# Patient Record
Sex: FEMALE | Race: WHITE | NOT HISPANIC OR LATINO | Employment: FULL TIME | URBAN - METROPOLITAN AREA
[De-identification: names, ages, dates, MRNs, and addresses within clinical notes are randomized per-mention and may not be internally consistent; named-entity substitution may affect disease eponyms.]

---

## 2023-01-19 ENCOUNTER — HOSPITAL ENCOUNTER (OUTPATIENT)
Dept: RADIOLOGY | Facility: REHABILITATION | Age: 38
End: 2023-01-19
Attending: STUDENT IN AN ORGANIZED HEALTH CARE EDUCATION/TRAINING PROGRAM

## 2023-01-19 ENCOUNTER — HOSPITAL ENCOUNTER (OUTPATIENT)
Facility: REHABILITATION | Age: 38
End: 2023-01-19
Attending: STUDENT IN AN ORGANIZED HEALTH CARE EDUCATION/TRAINING PROGRAM | Admitting: STUDENT IN AN ORGANIZED HEALTH CARE EDUCATION/TRAINING PROGRAM
Payer: COMMERCIAL

## 2023-01-19 VITALS
DIASTOLIC BLOOD PRESSURE: 84 MMHG | HEART RATE: 91 BPM | BODY MASS INDEX: 27.29 KG/M2 | WEIGHT: 163.8 LBS | TEMPERATURE: 98.5 F | HEIGHT: 65 IN | SYSTOLIC BLOOD PRESSURE: 142 MMHG | RESPIRATION RATE: 16 BRPM | OXYGEN SATURATION: 98 %

## 2023-01-19 PROCEDURE — 62323 NJX INTERLAMINAR LMBR/SAC: CPT

## 2023-01-19 PROCEDURE — 700111 HCHG RX REV CODE 636 W/ 250 OVERRIDE (IP)

## 2023-01-19 PROCEDURE — 700117 HCHG RX CONTRAST REV CODE 255

## 2023-01-19 RX ORDER — DEXAMETHASONE SODIUM PHOSPHATE 10 MG/ML
INJECTION, SOLUTION INTRAMUSCULAR; INTRAVENOUS
Status: COMPLETED
Start: 2023-01-19 | End: 2023-01-19

## 2023-01-19 RX ORDER — HYDROCODONE BITARTRATE AND ACETAMINOPHEN 10; 325 MG/1; MG/1
1-2 TABLET ORAL EVERY 6 HOURS PRN
COMMUNITY

## 2023-01-19 RX ORDER — IBUPROFEN 800 MG/1
800 TABLET ORAL EVERY 8 HOURS PRN
COMMUNITY

## 2023-01-19 RX ORDER — ONDANSETRON 2 MG/ML
4 INJECTION INTRAMUSCULAR; INTRAVENOUS
Status: DISCONTINUED | OUTPATIENT
Start: 2023-01-19 | End: 2023-01-19 | Stop reason: HOSPADM

## 2023-01-19 RX ORDER — GABAPENTIN 300 MG/1
300 CAPSULE ORAL 3 TIMES DAILY
COMMUNITY

## 2023-01-19 RX ORDER — CYCLOBENZAPRINE HCL 10 MG
10 TABLET ORAL 3 TIMES DAILY PRN
COMMUNITY

## 2023-01-19 RX ADMIN — DEXAMETHASONE SODIUM PHOSPHATE 10 MG: 10 INJECTION INTRAMUSCULAR; INTRAVENOUS at 14:00

## 2023-01-19 RX ADMIN — IOHEXOL 10 ML: 240 INJECTION, SOLUTION INTRATHECAL; INTRAVASCULAR; INTRAVENOUS; ORAL at 14:00

## 2023-01-19 ASSESSMENT — PAIN DESCRIPTION - PAIN TYPE: TYPE: CHRONIC PAIN

## 2023-01-19 NOTE — H&P
Surgery Neurosurgery History & Physical Note    Date  1/19/2023    Primary Care Physician  No primary care provider on file.    CC  Pre-Op Diagnosis Codes:     * Intervertebral disc disorders with radiculopathy, lumbar region [M51.16]    HPI  This is a 37 y.o. female who presented with low back and leg pain    History reviewed. No pertinent past medical history.    History reviewed. No pertinent surgical history.    No current facility-administered medications for this encounter.       Social History     Socioeconomic History    Marital status: Single     Spouse name: Not on file    Number of children: Not on file    Years of education: Not on file    Highest education level: Not on file   Occupational History    Not on file   Tobacco Use    Smoking status: Not on file    Smokeless tobacco: Not on file   Substance and Sexual Activity    Alcohol use: Not on file    Drug use: Not on file    Sexual activity: Not on file   Other Topics Concern    Not on file   Social History Narrative    Not on file     Social Determinants of Health     Financial Resource Strain: Not on file   Food Insecurity: Not on file   Transportation Needs: Not on file   Physical Activity: Not on file   Stress: Not on file   Social Connections: Not on file   Intimate Partner Violence: Not on file   Housing Stability: Not on file       History reviewed. No pertinent family history.    Allergies  Patient has no allergy information on record.    Review of Systems  Negative    Physical Exam  Constitutional:       Appearance: She is normal weight.   HENT:      Head: Normocephalic.      Mouth/Throat:      Mouth: Mucous membranes are moist.   Cardiovascular:      Rate and Rhythm: Normal rate.   Pulmonary:      Effort: Pulmonary effort is normal.      Breath sounds: Normal breath sounds.   Neurological:      Mental Status: She is alert.       Vital Signs  Blood Pressure: (!) 134/99   Temperature: 36.9 °C (98.5 °F)   Pulse: 95   Respiration: 16   Pulse  Oximetry: 99 %       Labs:                    Radiology:  DX-PORTABLE FLUOROSCOPY < 1 HOUR Is the patient pregnant? Unknown    (Results Pending)         Assessment/Plan:  Pre-Op Diagnosis Codes:     * Intervertebral disc disorders with radiculopathy, lumbar region [M51.16]  Procedure(s):  Interlaminar L5-S1 epidural steroid injection without IV sedation.

## 2023-01-19 NOTE — OR SURGEON
Immediate Post OP Note    Pre-Op Diagnosis Codes:     * Intervertebral disc disorders with radiculopathy, lumbar region [M51.16]      Post-Op Diagnosis Codes:     * Intervertebral disc disorders with radiculopathy, lumbar region [M51.16]      Procedure(s):  Interlaminar L4-L5 epidural steroid injection without IV sedation. - Wound Class: Clean    Surgeon(s):  Jaswant Busby M.D.    Anesthesiologist/Type of Anesthesia:  No anesthesia staff entered./Local    Surgical Staff:  Circulator: Hilda Davies R.N.  Scrub Person: Neela Deras  Radiology Technologist: Girish Rios    Specimens removed if any:  * No specimens in log *    Estimated Blood Loss: None    Findings: None    Complications: None        1/19/2023 2:21 PM Jaswant Busby M.D.

## 2023-01-19 NOTE — OP REPORT
L5/S1 was accessed but patient was feeling severe pressure at this level with minimal injectate - decision was made to move to L4/5    1.   L4/5   - Lumbar Epidural Steroid Injection (L-ANA PAULA),   RIGHT  -Side, Interlaminar Approach    2. Fluoroscopic guidance for needle localization    ANESTHESIA: Local   without   conscious sedation    ASA CLASSIFICATION: II    PROCEDURE IN DETAIL: Prior to the procedure, the risks and benefits of interventional treatments were discussed, which include: serious bleeding, infection, damage to surrounding tissues, vessels, nerves or organs, paralysis, lung puncture, increased pain, or severe allergic reaction. Such events could lead to serious disability or even death. Patient understood these risks and agreed to proceed.    After informed consent was obtained, the patient was taken to the fluoroscopy suite and placed in the prone position on the x-ray table. Hemodynamic monitoring was instituted and intravenous sedation   was not   given (see nurses notes).    Following a sterile prep with Chloraprep and drape in the usual sterile fashion, a mixture of 1% lidocaine was used to anesthetize the skin and soft tissues overlying the   RIGHT     L4/5   interlaminar space under fluoroscopic guidance using a 27 gauge 1-1/4-inch needle.    A 20 gauge Tuohy was directed under fluoroscopic guidance down to the   RIGHT   lamina of the   L5   vertebrae. Local anesthetic was placed over the periosteum of the lamina and the needle was redirected midline until it was engaged into the ligamentum flavum. Using a loss of resistance technique to preservative free saline, the epidural space was accessed. Final needle placement was confirmed using injection of Omnipaque   240   0.5 mL in both AP and lateral live fluoroscopic images. After negative aspiration for blood and CSF, the area was injected with a mixture of 10mg of dexamethasone, saline and 1 mL of 1% PF lidocaine.    The patient tolerated the  procedure well. There were no complications. The patient was monitored in the recovery room for 20 minutes. Prior to discharge the patient was given general instructions for post procedure care and what to expect from the injection. The patient was advised to call our office if there are any questions or concerning symptoms. The patient was then discharged home in stable condition.    TOTAL FLUORO TIME: 6 Seconds